# Patient Record
(demographics unavailable — no encounter records)

---

## 2024-12-29 NOTE — PHYSICAL EXAM
[No Acute Distress] : no acute distress [Well Nourished] : well nourished [Well Developed] : well developed [Well-Appearing] : well-appearing [Normal Sclera/Conjunctiva] : normal sclera/conjunctiva [EOMI] : extraocular movements intact [PERRL] : pupils equal round and reactive to light [Normal Outer Ear/Nose] : the outer ears and nose were normal in appearance [Normal Oropharynx] : the oropharynx was normal [No JVD] : no jugular venous distention [No Lymphadenopathy] : no lymphadenopathy [Supple] : supple [Thyroid Normal, No Nodules] : the thyroid was normal and there were no nodules present [No Respiratory Distress] : no respiratory distress  [No Accessory Muscle Use] : no accessory muscle use [Clear to Auscultation] : lungs were clear to auscultation bilaterally [Normal Rate] : normal rate  [Regular Rhythm] : with a regular rhythm [Normal S1, S2] : normal S1 and S2 [No Murmur] : no murmur heard [No Carotid Bruits] : no carotid bruits [No Abdominal Bruit] : a ~M bruit was not heard ~T in the abdomen [No Varicosities] : no varicosities [Pedal Pulses Present] : the pedal pulses are present [No Edema] : there was no peripheral edema [No Palpable Aorta] : no palpable aorta [No Extremity Clubbing/Cyanosis] : no extremity clubbing/cyanosis [Soft] : abdomen soft [Non Tender] : non-tender [Non-distended] : non-distended [No Masses] : no abdominal mass palpated [No HSM] : no HSM [Normal Bowel Sounds] : normal bowel sounds [No CVA Tenderness] : no CVA  tenderness [No Spinal Tenderness] : no spinal tenderness [No Joint Swelling] : no joint swelling [Grossly Normal Strength/Tone] : grossly normal strength/tone [No Rash] : no rash [Coordination Grossly Intact] : coordination grossly intact [No Focal Deficits] : no focal deficits [Normal Gait] : normal gait [Deep Tendon Reflexes (DTR)] : deep tendon reflexes were 2+ and symmetric [Normal Affect] : the affect was normal [Normal Insight/Judgement] : insight and judgment were intact

## 2024-12-31 NOTE — REVIEW OF SYSTEMS
[Insomnia] : insomnia [Negative] : Heme/Lymph [de-identified] : neck pain [de-identified] : feeling stressed

## 2024-12-31 NOTE — HISTORY OF PRESENT ILLNESS
[FreeTextEntry1] : MIGUEL A BAILEY is a 54 year old female here for a physical exam. [de-identified] : Her last physical exam was last year  Vaccines: Tetanus is up to date; last 12/18/2020 Shingrix is up to date  Her last dentist visit was less than one year ago Her last eye doctor appointment was a few years ago Her last dermatologist visit was less than one year ago, Dr. Bueno  GYN visit is up to date, Dr. Costello Mammogram is up to date Colon cancer screening is NOT up to date; colonoscopy 12/13/2019, Dr. Fernandez, repeat 5 years  Her diet is healthy overall Exercise: rarely

## 2024-12-31 NOTE — ASSESSMENT
[FreeTextEntry1] : MIGUEL A BAILEY is a 54 year old female here for a physical exam.  She has a history of vitamin D deficiency.  Her EKG shows low voltage in the limb leads, similar to previous.

## 2024-12-31 NOTE — HEALTH RISK ASSESSMENT
[0] : 2) Feeling down, depressed, or hopeless: Not at all (0) [PHQ-2 Negative - No further assessment needed] : PHQ-2 Negative - No further assessment needed [Never] : Never [[id number]] : 0

## 2024-12-31 NOTE — PLAN
[FreeTextEntry1] : Check labs as above.  Reviewed age-appropriate preventive screening tests with patient. She is due for a colonoscopy and mammogram and will schedule these.  Discussed clean eating (eg Mediterranean style eating plan) and regular exercise/staying as physically active as possible. Include balance exercises and strength training and core strengthening exercises for bone health and to decrease risk for falls.  Reviewed importance of good self care (e.g. meditation, yoga, adequate rest, regular exercise, magnesium, clean eating, etc.).  Follow up for next physical in one year.

## 2024-12-31 NOTE — HEALTH RISK ASSESSMENT
[0] : 2) Feeling down, depressed, or hopeless: Not at all (0) [PHQ-2 Negative - No further assessment needed] : PHQ-2 Negative - No further assessment needed [Never] : Never [[id number]] : 0

## 2024-12-31 NOTE — HISTORY OF PRESENT ILLNESS
[FreeTextEntry1] : MIGUEL A BAILEY is a 54 year old female here for a physical exam. [de-identified] : Her last physical exam was last year  Vaccines: Tetanus is up to date; last 12/18/2020 Shingrix is up to date  Her last dentist visit was less than one year ago Her last eye doctor appointment was a few years ago Her last dermatologist visit was less than one year ago, Dr. Bueno  GYN visit is up to date, Dr. Costello Mammogram is up to date Colon cancer screening is NOT up to date; colonoscopy 12/13/2019, Dr. Fernandez, repeat 5 years  Her diet is healthy overall Exercise: rarely

## 2024-12-31 NOTE — REVIEW OF SYSTEMS
[Insomnia] : insomnia [Negative] : Heme/Lymph [de-identified] : neck pain [de-identified] : feeling stressed